# Patient Record
Sex: FEMALE | Race: WHITE | NOT HISPANIC OR LATINO | Employment: STUDENT | URBAN - NONMETROPOLITAN AREA
[De-identification: names, ages, dates, MRNs, and addresses within clinical notes are randomized per-mention and may not be internally consistent; named-entity substitution may affect disease eponyms.]

---

## 2017-03-15 ENCOUNTER — OFFICE VISIT (OUTPATIENT)
Dept: RETAIL CLINIC | Facility: CLINIC | Age: 22
End: 2017-03-15

## 2017-03-15 DIAGNOSIS — Z02.1 DRUG SCREENING, PRE-EMPLOYMENT: Primary | ICD-10-CM

## 2022-03-24 ENCOUNTER — OFFICE VISIT (OUTPATIENT)
Dept: OBSTETRICS AND GYNECOLOGY | Facility: CLINIC | Age: 27
End: 2022-03-24

## 2022-03-24 VITALS
DIASTOLIC BLOOD PRESSURE: 82 MMHG | WEIGHT: 279 LBS | HEIGHT: 64 IN | SYSTOLIC BLOOD PRESSURE: 146 MMHG | BODY MASS INDEX: 47.63 KG/M2

## 2022-03-24 DIAGNOSIS — Z83.49 FAMILY HISTORY OF MTHFR DEFICIENCY: ICD-10-CM

## 2022-03-24 DIAGNOSIS — Z11.3 ROUTINE SCREENING FOR STI (SEXUALLY TRANSMITTED INFECTION): ICD-10-CM

## 2022-03-24 DIAGNOSIS — Z12.4 SCREENING FOR CERVICAL CANCER: ICD-10-CM

## 2022-03-24 DIAGNOSIS — N91.2 AMENORRHEA: ICD-10-CM

## 2022-03-24 DIAGNOSIS — R61 NIGHT SWEATS: ICD-10-CM

## 2022-03-24 DIAGNOSIS — Z01.419 ENCOUNTER FOR GYNECOLOGICAL EXAMINATION WITHOUT ABNORMAL FINDING: Primary | ICD-10-CM

## 2022-03-24 PROCEDURE — 99213 OFFICE O/P EST LOW 20 MIN: CPT | Performed by: PHYSICIAN ASSISTANT

## 2022-03-24 PROCEDURE — 99385 PREV VISIT NEW AGE 18-39: CPT | Performed by: PHYSICIAN ASSISTANT

## 2022-03-24 NOTE — PATIENT INSTRUCTIONS
Encourage self breast exam monthly  Regular exercise  Condoms always  We will check labs today and follow-up in 3 weeks to review

## 2022-03-24 NOTE — PROGRESS NOTES
Subjective   Chief Complaint   Patient presents with   • Gynecologic Exam     Patient complains of night sweats.        Ada Perry is a 26 y.o. year old  presenting to be seen for her annual gynecological exam.   She has not previously had a GYN exam or Pap smear.  She has a history of secondary amenorrhea and has not had a menstrual cycle since 2020.  She would frequently go 5 to 6 months without menses prior to her LMP.  She reports night sweats have been going on for several months  She has been sexually active in the past, but has not been sexually active for a few months.  She used condoms sometimes but not consistently.  Has had multiple partners but one partner in the past year.   She has a sister who is positive for MTFHR  Would like STI screening with pap   Past Medical History:   Diagnosis Date   • Patient denies medical problems       No current outpatient medications on file.   No Known Allergies   Past Surgical History:   Procedure Laterality Date   • COLONOSCOPY     • TONSILLECTOMY     • WISDOM TOOTH EXTRACTION        Social History     Socioeconomic History   • Marital status: Single   Tobacco Use   • Smoking status: Never Smoker   • Smokeless tobacco: Never Used   Vaping Use   • Vaping Use: Never used   Substance and Sexual Activity   • Alcohol use: Yes   • Drug use: Never   • Sexual activity: Yes     Partners: Male     Birth control/protection: Abstinence, Condom      Family History   Problem Relation Age of Onset   • Pulmonary embolism Mother    • Lung cancer Mother    • Autoimmune disease Mother    • Diabetes Brother    • Other Sister         MTFHR   • Endometrial cancer Sister    • Polycystic ovary syndrome Sister    • Diabetes Paternal Grandfather    • Ovarian cancer Paternal Grandmother    • Breast cancer Paternal Grandmother    • Osteoporosis Maternal Grandmother    • Coronary artery disease Maternal Grandfather    • Breast cancer Paternal Aunt        Review of Systems  "  Constitutional: Negative for chills, diaphoresis and fever.        Weight gain   Gastrointestinal: Positive for diarrhea and nausea.   Endocrine:        Hot flashes   Genitourinary: Positive for menstrual problem and vaginal discharge.           Objective   /82   Ht 162.6 cm (64\")   Wt 127 kg (279 lb)   LMP 08/01/2020 (Approximate)   Breastfeeding No   BMI 47.89 kg/m²     Physical Exam  Constitutional:       Appearance: Normal appearance. She is well-developed and well-groomed. She is morbidly obese.   Eyes:      General: Lids are normal.      Extraocular Movements: Extraocular movements intact.      Conjunctiva/sclera: Conjunctivae normal.   Chest:   Breasts: Breasts are symmetrical.      Right: No inverted nipple, mass, nipple discharge, skin change, tenderness or axillary adenopathy.      Left: No inverted nipple, mass, nipple discharge, skin change, tenderness or axillary adenopathy.       Abdominal:      Palpations: Abdomen is soft.      Tenderness: There is no abdominal tenderness.   Genitourinary:     Labia:         Right: No rash, tenderness or lesion.         Left: No rash, tenderness or lesion.       Urethra: No prolapse, urethral pain, urethral swelling or urethral lesion.      Vagina: No vaginal discharge, tenderness or lesions.      Cervix: No cervical motion tenderness, discharge, friability or lesion.      Uterus: Not enlarged and not tender.       Adnexa:         Right: No mass or tenderness.          Left: No mass or tenderness.     Lymphadenopathy:      Upper Body:      Right upper body: No axillary adenopathy.      Left upper body: No axillary adenopathy.   Skin:     General: Skin is warm and dry.      Findings: No bruising or lesion.   Neurological:      General: No focal deficit present.      Mental Status: She is alert and oriented to person, place, and time.   Psychiatric:         Attention and Perception: Attention normal.         Mood and Affect: Mood normal.         Speech: " Speech normal.         Behavior: Behavior is cooperative.            Result Review :                   Assessment and Plan  Diagnoses and all orders for this visit:    1. Encounter for gynecological examination without abnormal finding (Primary)    2. Screening for cervical cancer  -     Pap IG, Ct-Ng, Rfx HPV ASCU; Future    3. Amenorrhea  -     Testosterone (Free & Total), LC / MS  -     Insulin, Total  -     Comprehensive Metabolic Panel  -     TSH  -     Prolactin  -     Follicle Stimulating Hormone  -     Estradiol  -     Luteinizing Hormone  -     HCG, B-subunit, Quantitative ([facility] Only)    4. Night sweats    5. Family history of MTHFR deficiency  -     MTHFR Mutation    6. Routine screening for STI (sexually transmitted infection)  -     Pap IG, Ct-Ng, Rfx HPV ASCU; Future      Patient Instructions   Encourage self breast exam monthly  Regular exercise  Condoms always  We will check labs today and follow-up in 3 weeks to review             This note was electronically signed.    Cherri Brian PA-C   March 24, 2022

## 2022-04-06 LAB
ALBUMIN SERPL-MCNC: 4.5 G/DL (ref 3.9–5)
ALBUMIN/GLOB SERPL: 1.9 {RATIO} (ref 1.2–2.2)
ALP SERPL-CCNC: 76 IU/L (ref 44–121)
ALT SERPL-CCNC: 60 IU/L (ref 0–32)
AST SERPL-CCNC: 41 IU/L (ref 0–40)
BILIRUB SERPL-MCNC: 0.3 MG/DL (ref 0–1.2)
BUN SERPL-MCNC: 13 MG/DL (ref 6–20)
BUN/CREAT SERPL: 19 (ref 9–23)
CALCIUM SERPL-MCNC: 9.5 MG/DL (ref 8.7–10.2)
CHLORIDE SERPL-SCNC: 106 MMOL/L (ref 96–106)
CO2 SERPL-SCNC: 19 MMOL/L (ref 20–29)
CREAT SERPL-MCNC: 0.7 MG/DL (ref 0.57–1)
EGFRCR SERPLBLD CKD-EPI 2021: 124 ML/MIN/1.73
ESTRADIOL SERPL-MCNC: 30.1 PG/ML
FSH SERPL-ACNC: 3.6 MIU/ML
GLOBULIN SER CALC-MCNC: 2.4 G/DL (ref 1.5–4.5)
GLUCOSE SERPL-MCNC: 103 MG/DL (ref 65–99)
HCG INTACT+B SERPL-ACNC: <1 MIU/ML
INSULIN SERPL-ACNC: 74.1 UIU/ML (ref 2.6–24.9)
LH SERPL-ACNC: 6.5 MIU/ML
MTHFR GENE MUT ANL BLD/T: NORMAL
POTASSIUM SERPL-SCNC: 3.6 MMOL/L (ref 3.5–5.2)
PROLACTIN SERPL-MCNC: 3.4 NG/ML (ref 4.8–23.3)
PROT SERPL-MCNC: 6.9 G/DL (ref 6–8.5)
SODIUM SERPL-SCNC: 143 MMOL/L (ref 134–144)
TESTOST FREE SERPL-MCNC: 2.4 PG/ML (ref 0–4.2)
TESTOST SERPL-MCNC: 41.8 NG/DL (ref 10–55)
TSH SERPL DL<=0.005 MIU/L-ACNC: 1.15 UIU/ML (ref 0.45–4.5)

## 2022-04-11 DIAGNOSIS — Z12.4 SCREENING FOR CERVICAL CANCER: ICD-10-CM

## 2022-04-11 DIAGNOSIS — Z11.3 ROUTINE SCREENING FOR STI (SEXUALLY TRANSMITTED INFECTION): ICD-10-CM

## 2022-04-12 RX ORDER — METRONIDAZOLE 500 MG/1
500 TABLET ORAL 2 TIMES DAILY
Qty: 14 TABLET | Refills: 0 | Status: SHIPPED | OUTPATIENT
Start: 2022-04-12 | End: 2022-04-19

## 2022-04-12 NOTE — PROGRESS NOTES
Please inform patient her Pap smear has returned and is nondiagnostic due to inflammation.  It shows no trichomonas infection.  I have sent an antibiotic to her pharmacy for treatment.  Her partner will need to seek treatment for trichomonas.  Abstain for 7 days once both have completed treatment.  Will need to repeat her Pap smear after treatment is completed.

## 2022-07-15 DIAGNOSIS — Z15.89 HOMOZYGOUS FOR MTHFR GENE MUTATION: Primary | ICD-10-CM

## 2022-08-24 NOTE — PROGRESS NOTES
"  Subjective     PROBLEM LIST:  1. MTHFR mutation    CHIEF COMPLAINT: MTHFR mutation      HISTORY OF PRESENT ILLNESS:  The patient is a 27 y.o. female, referred for evaluation of an MTHFR mutation.    She says that her sister was found to have a heterozygous MTHFR mutation during evaluation for multiple miscarriages and infertility.  Her sister also has PCOS and endometriosis.  Her sister after losing weight is currently pregnant.    The patient had MTHFR mutation testing and was found to be homozygous.  She has no personal or family history of VTE.  She has never attempted pregnancy.  She says that her blood pressure is high and she is trying to exercise more.    REVIEW OF SYSTEMS:  A 14 point review of systems was performed and is negative except as noted above.    Past Medical History:   Diagnosis Date   • Patient denies medical problems                Objective     /94   Pulse 98   Temp 96.8 °F (36 °C) (Temporal)   Resp 16   Ht 162.6 cm (64\")   Wt 128 kg (283 lb)   SpO2 99%   BMI 48.58 kg/m²   Performance Status:0              General: well appearing female in no acute distress  Neuro: alert and oriented  Extremities: no lower extremity edema  Skin: no rashes, lesions, bruising, or petechiae  Psych: mood and affect appropriate    No results found for: WBC, HGB, HCT, MCV, PLT  Lab Results   Component Value Date    GLUCOSE 103 (H) 03/24/2022    BUN 13 03/24/2022    CREATININE 0.70 03/24/2022    BCR 19 03/24/2022    K 3.6 03/24/2022    CO2 19 (L) 03/24/2022    CALCIUM 9.5 03/24/2022    PROTENTOTREF 6.9 03/24/2022    ALBUMIN 4.5 03/24/2022    LABIL2 1.9 03/24/2022    AST 41 (H) 03/24/2022    ALT 60 (H) 03/24/2022       No image results found.            ASSESSMENT AND PLAN:     Ada Perry is a 27 y.o. female referred after a lab test showed that she was homozygous for an MTHFR mutation.    We discussed that MTHFR homozygous mutations are fairly common and present in about 10% of the general " population, while about 40% are heterozygous.  The most recent data indicates that these variants are not significantly associated with disease or cardiovascular outcomes.  I do not have any specific recommendations for her other than to encourage her in her efforts for ongoing weight loss and increasing activity.  I do not think she needs any specific hematology follow-up.  Her other family members do not need to be screened for this mutation.    F/u PRN         A total greater than 35 mins minutes was spent in face to face patient time, examination, counseling, charting, reviewing test results, and reviewing outside records.    Sabrina Rey MD    8/30/2022

## 2022-08-27 ENCOUNTER — TELEMEDICINE (OUTPATIENT)
Dept: FAMILY MEDICINE CLINIC | Facility: TELEHEALTH | Age: 27
End: 2022-08-27

## 2022-08-27 DIAGNOSIS — R39.89 SUSPECTED UTI: Primary | ICD-10-CM

## 2022-08-27 PROCEDURE — 99213 OFFICE O/P EST LOW 20 MIN: CPT | Performed by: NURSE PRACTITIONER

## 2022-08-27 RX ORDER — PHENAZOPYRIDINE HYDROCHLORIDE 100 MG/1
100 TABLET, FILM COATED ORAL 3 TIMES DAILY PRN
Qty: 6 TABLET | Refills: 0 | Status: SHIPPED | OUTPATIENT
Start: 2022-08-27 | End: 2022-08-29

## 2022-08-27 RX ORDER — NITROFURANTOIN 25; 75 MG/1; MG/1
100 CAPSULE ORAL 2 TIMES DAILY
Qty: 14 CAPSULE | Refills: 0 | Status: SHIPPED | OUTPATIENT
Start: 2022-08-27 | End: 2022-09-03

## 2022-08-27 NOTE — PROGRESS NOTES
You have chosen to receive care through a telehealth visit.  Do you consent to use a video/audio connection for your medical care today? Yes     CHIEF COMPLAINT  Chief Complaint   Patient presents with   • Urinary Tract Infection         HPI  Ada Perry is a 26 y.o. female  presents with complaint of UTI. Reports dysuria, small amt of pink when she wipes. No fever or chills. No nausea or vomiting. No back pain. Denies pyelonephritis. Reports she has not taken any medications for her symptoms.    Review of Systems   Constitutional: Negative for chills, fatigue and fever.   HENT: Negative.    Respiratory: Negative for chest tightness and shortness of breath.    Gastrointestinal: Negative for abdominal pain, nausea and vomiting.   Genitourinary: Positive for dysuria, frequency and hematuria. Negative for urgency.   Musculoskeletal: Negative for back pain.   Neurological: Negative for headaches.   Psychiatric/Behavioral: Negative.        Past Medical History:   Diagnosis Date   • Patient denies medical problems        Family History   Problem Relation Age of Onset   • Pulmonary embolism Mother    • Lung cancer Mother    • Autoimmune disease Mother    • Diabetes Brother    • Other Sister         MTFHR   • Endometrial cancer Sister    • Polycystic ovary syndrome Sister    • Diabetes Paternal Grandfather    • Ovarian cancer Paternal Grandmother    • Breast cancer Paternal Grandmother    • Osteoporosis Maternal Grandmother    • Coronary artery disease Maternal Grandfather    • Breast cancer Paternal Aunt        Social History     Socioeconomic History   • Marital status: Single   Tobacco Use   • Smoking status: Never Smoker   • Smokeless tobacco: Never Used   Vaping Use   • Vaping Use: Never used   Substance and Sexual Activity   • Alcohol use: Yes   • Drug use: Never   • Sexual activity: Yes     Partners: Male     Birth control/protection: Abstinence, Condom       Ada Perry  reports that she has never smoked. She  has never used smokeless tobacco.. I have educated her on the risk of diseases from using tobacco products such as cancer, COPD and heart disease.       I spent 1 minutes counseling the patient.              Breastfeeding No     PHYSICAL EXAM  Physical Exam   Constitutional: She is oriented to person, place, and time. She appears well-developed and well-nourished. No distress.   HENT:   Head: Normocephalic and atraumatic.   Right Ear: Hearing normal.   Left Ear: Hearing normal.   Nose: No congestion.   Mouth/Throat: Mouth/Lips are normal.  Eyes: Conjunctivae and lids are normal.   Pulmonary/Chest: Effort normal.  No respiratory distress.  Abdominal: There is abdominal tenderness in the suprapubic area. There is no CVA tenderness.   Patient directed exam   Lymphadenopathy:        Right cervical: No anterior cervical adenopathy present.       Left cervical: No anterior cervical adenopathy present.   Neurological: She is alert and oriented to person, place, and time.   Psychiatric: She has a normal mood and affect. Her speech is normal and behavior is normal.       Results for orders placed or performed in visit on 03/24/22   Testosterone (Free & Total), LC / MS    Specimen: Blood   Result Value Ref Range    Testosterone, Total 41.8 10.0 - 55.0 ng/dL    Testosterone, Free 2.4 0.0 - 4.2 pg/mL   Insulin, Total    Specimen: Blood   Result Value Ref Range    Insulin 74.1 (H) 2.6 - 24.9 uIU/mL   Comprehensive Metabolic Panel    Specimen: Blood   Result Value Ref Range    Glucose 103 (H) 65 - 99 mg/dL    BUN 13 6 - 20 mg/dL    Creatinine 0.70 0.57 - 1.00 mg/dL    EGFR Result 124 >59 mL/min/1.73    BUN/Creatinine Ratio 19 9 - 23    Sodium 143 134 - 144 mmol/L    Potassium 3.6 3.5 - 5.2 mmol/L    Chloride 106 96 - 106 mmol/L    Total CO2 19 (L) 20 - 29 mmol/L    Calcium 9.5 8.7 - 10.2 mg/dL    Total Protein 6.9 6.0 - 8.5 g/dL    Albumin 4.5 3.9 - 5.0 g/dL    Globulin 2.4 1.5 - 4.5 g/dL    A/G Ratio 1.9 1.2 - 2.2    Total  Bilirubin 0.3 0.0 - 1.2 mg/dL    Alkaline Phosphatase 76 44 - 121 IU/L    AST (SGOT) 41 (H) 0 - 40 IU/L    ALT (SGPT) 60 (H) 0 - 32 IU/L   TSH    Specimen: Blood   Result Value Ref Range    TSH 1.150 0.450 - 4.500 uIU/mL   Prolactin    Specimen: Blood   Result Value Ref Range    Prolactin 3.4 (L) 4.8 - 23.3 ng/mL   Follicle Stimulating Hormone    Specimen: Blood   Result Value Ref Range    FSH 3.6 mIU/mL   Estradiol    Specimen: Blood   Result Value Ref Range    Estradiol 30.1 pg/mL   Luteinizing Hormone    Specimen: Blood   Result Value Ref Range    LH 6.5 mIU/mL   MTHFR Mutation    Specimen: Blood   Result Value Ref Range    MTHFR Comment    HCG, B-subunit, Quantitative ([facility] Only)    Specimen: Blood   Result Value Ref Range    HCG Quantitative <1 mIU/mL       Diagnoses and all orders for this visit:    1. Suspected UTI (Primary)    Other orders  -     nitrofurantoin, macrocrystal-monohydrate, (Macrobid) 100 MG capsule; Take 1 capsule by mouth 2 (Two) Times a Day for 7 days.  Dispense: 14 capsule; Refill: 0  -     phenazopyridine (Pyridium) 100 MG tablet; Take 1 tablet by mouth 3 (Three) Times a Day As Needed for Bladder Spasms for up to 2 days.  Dispense: 6 tablet; Refill: 0    rest  Increase water  PCP if symptoms persist  ER for worsening symptoms such as high fever, nausea vomiting, or abdominal or back pain      FOLLOW-UP  As discussed during visit with PCP/[facility] if no improvement or Urgent Care/Emergency Department if worsening of symptoms    Patient verbalizes understanding of medication dosage, comfort measures, instructions for treatment and follow-up.    Shirley Cortés, APRN  08/27/2022  19:41 EDT    The use of a video visit has been reviewed with the patient and verbal informed consent has been obtained. Myself and Ada Perry participated in this visit. The patient is located in [address] [place] [address].    I am located in [place], [place]. Mychart and Zoom were  utilized. I spent 5 minutes in the patient's chart for this visit.

## 2022-08-30 ENCOUNTER — CONSULT (OUTPATIENT)
Dept: ONCOLOGY | Facility: CLINIC | Age: 27
End: 2022-08-30

## 2022-08-30 VITALS
TEMPERATURE: 96.8 F | OXYGEN SATURATION: 99 % | BODY MASS INDEX: 48.32 KG/M2 | HEART RATE: 98 BPM | SYSTOLIC BLOOD PRESSURE: 170 MMHG | WEIGHT: 283 LBS | DIASTOLIC BLOOD PRESSURE: 94 MMHG | HEIGHT: 64 IN | RESPIRATION RATE: 16 BRPM

## 2022-08-30 DIAGNOSIS — Z15.89 MTHFR MUTATION: Primary | ICD-10-CM

## 2022-08-30 PROCEDURE — 99203 OFFICE O/P NEW LOW 30 MIN: CPT | Performed by: INTERNAL MEDICINE

## 2022-09-28 ENCOUNTER — OFFICE VISIT (OUTPATIENT)
Dept: INTERNAL MEDICINE | Facility: CLINIC | Age: 27
End: 2022-09-28

## 2022-09-28 VITALS
HEART RATE: 100 BPM | WEIGHT: 280 LBS | DIASTOLIC BLOOD PRESSURE: 86 MMHG | TEMPERATURE: 97.7 F | HEIGHT: 63 IN | OXYGEN SATURATION: 99 % | SYSTOLIC BLOOD PRESSURE: 113 MMHG | RESPIRATION RATE: 16 BRPM | BODY MASS INDEX: 49.61 KG/M2

## 2022-09-28 DIAGNOSIS — Z13.0 SCREENING FOR ENDOCRINE, NUTRITIONAL, METABOLIC AND IMMUNITY DISORDER: ICD-10-CM

## 2022-09-28 DIAGNOSIS — Z13.21 SCREENING FOR ENDOCRINE, NUTRITIONAL, METABOLIC AND IMMUNITY DISORDER: ICD-10-CM

## 2022-09-28 DIAGNOSIS — Z13.29 SCREENING FOR ENDOCRINE, NUTRITIONAL, METABOLIC AND IMMUNITY DISORDER: ICD-10-CM

## 2022-09-28 DIAGNOSIS — E88.81 INSULIN RESISTANCE: ICD-10-CM

## 2022-09-28 DIAGNOSIS — Z13.228 SCREENING FOR ENDOCRINE, NUTRITIONAL, METABOLIC AND IMMUNITY DISORDER: ICD-10-CM

## 2022-09-28 DIAGNOSIS — N92.6 IRREGULAR PERIODS/MENSTRUAL CYCLES: ICD-10-CM

## 2022-09-28 DIAGNOSIS — E55.9 VITAMIN D INSUFFICIENCY: ICD-10-CM

## 2022-09-28 DIAGNOSIS — Z00.00 PHYSICAL EXAM, ANNUAL: Primary | ICD-10-CM

## 2022-09-28 DIAGNOSIS — E66.01 MORBID OBESITY WITH BODY MASS INDEX (BMI) OF 40.0 TO 49.9: ICD-10-CM

## 2022-09-28 PROCEDURE — 99395 PREV VISIT EST AGE 18-39: CPT | Performed by: NURSE PRACTITIONER

## 2022-09-28 PROCEDURE — 3008F BODY MASS INDEX DOCD: CPT | Performed by: NURSE PRACTITIONER

## 2022-09-28 PROCEDURE — 2014F MENTAL STATUS ASSESS: CPT | Performed by: NURSE PRACTITIONER

## 2022-09-29 LAB
25(OH)D3+25(OH)D2 SERPL-MCNC: 19.4 NG/ML (ref 30–100)
ALBUMIN SERPL-MCNC: 4.6 G/DL (ref 3.5–5.2)
ALBUMIN/GLOB SERPL: 2.7 G/DL
ALP SERPL-CCNC: 62 U/L (ref 39–117)
ALT SERPL-CCNC: 42 U/L (ref 1–33)
AST SERPL-CCNC: 27 U/L (ref 1–32)
BASOPHILS # BLD AUTO: 0.05 10*3/MM3 (ref 0–0.2)
BASOPHILS NFR BLD AUTO: 0.7 % (ref 0–1.5)
BILIRUB SERPL-MCNC: 0.4 MG/DL (ref 0–1.2)
BUN SERPL-MCNC: 14 MG/DL (ref 6–20)
BUN/CREAT SERPL: 18.7 (ref 7–25)
CALCIUM SERPL-MCNC: 9.4 MG/DL (ref 8.6–10.5)
CHLORIDE SERPL-SCNC: 107 MMOL/L (ref 98–107)
CHOLEST SERPL-MCNC: 189 MG/DL (ref 0–200)
CO2 SERPL-SCNC: 25.9 MMOL/L (ref 22–29)
CREAT SERPL-MCNC: 0.75 MG/DL (ref 0.57–1)
EGFRCR SERPLBLD CKD-EPI 2021: 113.5 ML/MIN/1.73
EOSINOPHIL # BLD AUTO: 0.17 10*3/MM3 (ref 0–0.4)
EOSINOPHIL NFR BLD AUTO: 2.4 % (ref 0.3–6.2)
ERYTHROCYTE [DISTWIDTH] IN BLOOD BY AUTOMATED COUNT: 12.4 % (ref 12.3–15.4)
FERRITIN SERPL-MCNC: 68 NG/ML (ref 13–150)
FT4I SERPL CALC-MCNC: 2.1 (ref 1.2–4.9)
GLOBULIN SER CALC-MCNC: 1.7 GM/DL
GLUCOSE SERPL-MCNC: 98 MG/DL (ref 65–99)
HBA1C MFR BLD: 5.3 % (ref 4.8–5.6)
HCT VFR BLD AUTO: 42.2 % (ref 34–46.6)
HDLC SERPL-MCNC: 41 MG/DL (ref 40–60)
HGB BLD-MCNC: 14.8 G/DL (ref 12–15.9)
IMM GRANULOCYTES # BLD AUTO: 0.01 10*3/MM3 (ref 0–0.05)
IMM GRANULOCYTES NFR BLD AUTO: 0.1 % (ref 0–0.5)
IRON SATN MFR SERPL: 31 % (ref 20–50)
IRON SERPL-MCNC: 112 MCG/DL (ref 37–145)
LDLC SERPL CALC-MCNC: 114 MG/DL (ref 0–100)
LYMPHOCYTES # BLD AUTO: 2.6 10*3/MM3 (ref 0.7–3.1)
LYMPHOCYTES NFR BLD AUTO: 36.8 % (ref 19.6–45.3)
MCH RBC QN AUTO: 31 PG (ref 26.6–33)
MCHC RBC AUTO-ENTMCNC: 35.1 G/DL (ref 31.5–35.7)
MCV RBC AUTO: 88.5 FL (ref 79–97)
MONOCYTES # BLD AUTO: 0.53 10*3/MM3 (ref 0.1–0.9)
MONOCYTES NFR BLD AUTO: 7.5 % (ref 5–12)
NEUTROPHILS # BLD AUTO: 3.71 10*3/MM3 (ref 1.7–7)
NEUTROPHILS NFR BLD AUTO: 52.5 % (ref 42.7–76)
NRBC BLD AUTO-RTO: 0 /100 WBC (ref 0–0.2)
PLATELET # BLD AUTO: 276 10*3/MM3 (ref 140–450)
POTASSIUM SERPL-SCNC: 4.7 MMOL/L (ref 3.5–5.2)
PROT SERPL-MCNC: 6.3 G/DL (ref 6–8.5)
RBC # BLD AUTO: 4.77 10*6/MM3 (ref 3.77–5.28)
SODIUM SERPL-SCNC: 144 MMOL/L (ref 136–145)
T3RU NFR SERPL: 24 % (ref 24–39)
T4 SERPL-MCNC: 8.6 UG/DL (ref 4.5–12)
THYROPEROXIDASE AB SERPL-ACNC: <8 IU/ML (ref 0–34)
TIBC SERPL-MCNC: 367 MCG/DL
TRIGL SERPL-MCNC: 191 MG/DL (ref 0–150)
TSH SERPL DL<=0.005 MIU/L-ACNC: 2.23 UIU/ML (ref 0.45–4.5)
UIBC SERPL-MCNC: 255 MCG/DL (ref 112–346)
VIT B12 SERPL-MCNC: 343 PG/ML (ref 211–946)
VLDLC SERPL CALC-MCNC: 34 MG/DL (ref 5–40)
WBC # BLD AUTO: 7.07 10*3/MM3 (ref 3.4–10.8)

## 2022-09-29 RX ORDER — ERGOCALCIFEROL 1.25 MG/1
50000 CAPSULE ORAL WEEKLY
Qty: 12 CAPSULE | Refills: 0 | Status: SHIPPED | OUTPATIENT
Start: 2022-09-29

## 2022-10-08 ENCOUNTER — HOSPITAL ENCOUNTER (OUTPATIENT)
Dept: ULTRASOUND IMAGING | Facility: HOSPITAL | Age: 27
Discharge: HOME | End: 2022-10-08
Admitting: NURSE PRACTITIONER

## 2022-10-08 PROCEDURE — 76830 TRANSVAGINAL US NON-OB: CPT

## 2022-10-12 NOTE — PROGRESS NOTES
Please contact patient and let her know that transvaginal ultrasound does not visualize right ovary and there is limited evaluation recommend seeing GYN for further evaluation.  She should be able to call and make an appointment but if she needs referral please let me know.

## 2022-12-29 ENCOUNTER — E-VISIT (OUTPATIENT)
Dept: FAMILY MEDICINE CLINIC | Facility: TELEHEALTH | Age: 27
End: 2022-12-29

## 2022-12-29 NOTE — E-VISIT ESCALATED
Patient escalated   Provider Shala Duff chose to escalate patient to another level of care because: Desired treatments not available   Patient was sent the following message:   Based on the information you've provided us, you need to take another step to get care. [facility] is unable to diagnose ear infections as the eardrum need to be visualized to do this. Please go to your nearest Urgent Care facility for an evaluation.   What to do now:   Urgent Care   You should be seen within the next 24 hours.   Please go to a walk-in clinic or urgent care, or make an appointment to be seen within the next 24 hours.   You won't be charged for your eVisit. If you paid with a credit card, the charge will be reversed.   Chief Complaint: Ear pain   Patient introduction   Patient is 27-year-old female who has a blocked sensation, a sensation of fullness, and pressure in their left ear.   Patient-submitted comments   Patient writes: I have an ear infection, and I was needing drops and an antibiotic. .   Patient did not request an excuse note.   General presentation   Patient first noticed symptoms 2 to 3 days ago. They came on gradually. Symptoms come and go.   Patient does not have a fever.   Patient has symptoms of tinnitus. No hearing loss. New or increased drainage from the left ear. Pain and discomfort didn't improve after drainage started. Drainage smells foul. Drainage is white.   No history of PE tubes.   Patient's outer ear is swollen.   Patient swam or got water in their ears in the last 7 days. Recent exposure to tobacco smoke. Patient regularly uses earphones.   No recent airplane travel, scuba diving, hiking or driving in the mountains, or exposure to a loud blast or explosion.   Patient has used the following OTC medication(s) for their ear symptoms: acetaminophen and cetirizine.   Last antibiotic treatment for an ear infection was more than 1 year ago.   Review of alarm symptoms/red flags:    No  current treatment by ENT    No current PE tubes in affected ear(s)    No mastoid or ear surgery in the last 5 years    No sharp or pointed object in ear canal    No foreign body in ear    No recent head trauma    No vision changes    No symptoms suggesting mastoiditis    No hearing loss in both ears    No red/bloody ear drainage   Pregnancy/menstrual status/breastfeeding:   Not pregnant. Not breastfeeding. Regarding last menstrual period, patient writes: August 2020 I do not have a regular period .   Self-exam:    No difficulty moving their chin toward their chest    Symptoms including mastoid pain or tenderness    Pain is unchanged by chewing or moving the jaw    Pain is exacerbated by manipulation of the pinna and/or pressure on the tragus    Pain is worse when lying down   Current medications   Currently taking vitamin D 1.25 MG (82732 UT) capsule capsule.   Medication allergies   None.   Medication contraindication review   No history of arrhythmia, congestive heart failure, recent myocardial infarction, heart block, heart disease, hypertension, hyperthyroidism, mononucleosis, or myasthenia gravis.   No known history of amoxicillin-clavulanate-associated jaundice or hepatic impairment.   No known history of azithromycin-associated cholestatic jaundice or hepatic impairment.   No history of aspirin triad; NSAID-induced asthma/urticaria; coronary artery disease; coagulation disorder; urinary retention; electrolyte abnormalities; G6PD deficiency; GI bleeding; hypertension; influenza, varicella, or febrile viral infection; or CABG surgery.   Past medical history   Immune conditions: No immunocompromising conditions. No history of cancer.   Assessment:   Patient determined to need a level of care not appropriate to be delivered through eVisit.   Plan:   Patient informed of need to seek in-person care   ----------   Electronically signed by JESSICA Mcclelland on 2022-12-29 at 07:19AM   ----------   Patient Interview  Transcript:   How would you describe your ear pain or discomfort? Select all that apply.    Blocked or plugged    Full    Pressure   Not selected:    Achy    Itchy    Crackling or popping    Throbbing    Shooting/stabbing/sharp   Which ear is affected? Select one.    My left ear   Not selected:    My right ear    Both of my ears   When did you first notice this ear pain or discomfort? Select one.    2 to 3 days ago   Not selected:    Today    Yesterday    4 to 5 days ago    More than 5 days ago   Did your ear pain or discomfort come on suddenly or over time? Select one.    Over time   Not selected:    Suddenly    I'm not sure   Is the outside of the affected ear red, swollen, warm to the touch, or painful to the touch? Select all that apply.    Swollen   Not selected:    Red    Warm to the touch    Painful to the touch    None of these   Is your ear pain or discomfort always there, or does it come and go? Select one.    Comes and goes   Not selected:    Always there    I'm not sure   Does the pain or discomfort get worse when you bite or chew? Select one.    No   Not selected:    Yes   Along with your ear symptoms, have you had a fever or felt hot? Select one.    No   Not selected:    Yes   Do you have any of these on the same side as your affected ear?    Pain or tenderness over the bone behind your ear   Not selected:    Redness over the bone behind your ear    Warmth over the bone behind your ear    None of the above   Do your symptoms include the sudden onset of ringing in one or both ears? Select one.    Yes   Not selected:    No   Do your symptoms include hearing loss? Select one.    No   Not selected:    Yes, I can't hear as well as I usually do    Yes, I can't hear at all in either ear   Has there been fluid draining out of either ear? Select one.    Yes, and this is new or more than the usual amount   Not selected:    Yes, but no more than usual    No   Which ear has fluid draining out of it (or more fluid  draining out of it than usual)? Select one.    Left   Not selected:    Right    Both   Once your ear started draining, did your ear pain or discomfort suddenly get better? Select one.    No   Not selected:    Yes   Does the drainage smell bad or foul? Select one.    Yes   Not selected:    No   What color is the drainage? Select one.    White or clear   Not selected:    Yellow    [place] brown, like earwax    Green    Red or bloody    I'm not sure   Along with your ear symptoms, have you had any of these cold symptoms? Select all that apply.    None of the above   Not selected:    Runny nose    Stuffy nose (nasal congestion)    Postnasal drip    Sinus pain or pressure    Cough    Scratchy or sore throat   Along with your ear symptoms, do you have any of these throat or digestion symptoms? Select all that apply.    None of these   Not selected:    Burning feeling in your chest (heartburn)    Swallowed food or liquids getting stuck and coming back up    Feeling like there's a lump in your throat    Hoarse voice    Difficulty swallowing   Along with your ear symptoms, have you had a headache? Select one.    No   Not selected:    Yes, and the pain is mild to moderate    Yes, and the pain is severe    Yes, and the pain is unbearable    Yes, and it's the worst headache of my life   Have you had any of these vision changes? Select all that apply.    None of these   Not selected:    Blurry vision    Double vision    I can't see at all from one or both eyes   Along with your ear symptoms, have you felt dizzy or had vertigo? This includes feeling like you're spinning, swaying, or tilting; feeling light-headed; or feeling like the room is moving around you. Select one.    No   Not selected:    Yes   Do your symptoms include vomiting? Select one.    No   Not selected:    Yes   Can you move your chin toward your chest? Select one.    Yes   Not selected:    No, my neck is too stiff   Does your ear pain or discomfort get worse if  you do either of these: 1. Pull the cartilage part of your ear up and back 2. Push on your tragus (the flesh in front of your ear opening)    Yes   Not selected:    No   Take a moment and lie down. Does your ear pain or discomfort feel worse when you lie down? Select one.    Yes   Not selected:    No   Right before your symptoms started, did you put anything sharp or pointed into your ear canal? Select one.    No   Not selected:    Yes   Is it possible that you have something stuck in your ear canal? Examples include a bead, button, rock, or part of an earbud. Select one.    No   Not selected:    Yes   Right before your ear pain began, did you have a severe head or ear injury? Examples include: - A blow to your head or ear - Hitting your head or ear on a hard surface - Falling on your ear while skateboarding or skiing - A motor vehicle accident - A sports injury, such as in wrestling - Penetrating trauma, such as a knife wound Select one.    No   Not selected:    Yes   In the week before your symptoms started, did any of these apply to you? Select all that apply.    None of the above   Not selected:    Air travel    Scuba diving    Hiking or driving in the mountains    Exposed to a loud blast or explosion   In the few weeks before your symptoms started, did you go swimming or get water in one or both ears? Select one.    Yes   Not selected:    No   Have you recently been exposed to more smoke or air pollution than usual? Select all that apply.    Yes, tobacco smoke   Not selected:    Yes, smoke from a fire or wood-burning stove    Yes, air pollution    None of the above   Do you regularly use any of these items? Select all that apply.    Earbuds or in-ear headphones   Not selected:    Hearing aids    Swim caps    Cotton swabs to clean your ears    Earwax removal devices, such as an irrigation kit    None of the above   Are you being treated by an Ear, Nose and Throat (ENT) doctor for an ear condition? Select one.     No   Not selected:    Yes   Do you have ear tubes? Some other names for ear tubes are tympanostomy tubes, ventilation tubes, or pressure equalization (PE) tubes. Select one.    No   Not selected:    Yes, in my left ear only    Yes, in my right ear only    Yes, in both ears    No, but I've had them in the past   In the past 5 years, have you had mastoid surgery or ear surgery (not including ear tube placement or removal)? Select one.    No   Not selected:    Yes   When was the last time you were treated with antibiotics for an ear infection? This includes both oral antibiotics and antibiotic ear drops. Select one.    More than a year ago   Not selected:    Never    Within the last month    1 to 3 months ago    4 months to a year ago    I'm not sure   Do you have any of these conditions that can affect the immune system? Scroll to see all options. Select all that apply.    None of these   Not selected:    History of bone marrow transplant    Chronic kidney disease    Chronic liver disease (including cirrhosis)    HIV/AIDS    Inflammatory bowel disease (Crohn's disease or ulcerative colitis)    Lupus    Moderate to severe plaque psoriasis    Multiple sclerosis    Rheumatoid arthritis    Sickle cell anemia    Alpha or beta thalassemia    History of solid organ transplant (kidney, liver, or heart)    History of spleen removal    An autoimmune disorder not listed here    A condition requiring treatment with long-term use of oral steroids (such as prednisone, prednisolone, or dexamethasone)   Have you ever been diagnosed with cancer? Select one.    No   Not selected:    Yes, I have cancer now    Yes, but I'm in remission   Are you pregnant? Select one.    No   Not selected:    Yes   When was your last menstrual period? If you don't currently have periods or no longer have periods, please briefly explain.    August 2020 I do not have a regular period   Are you breastfeeding? Select one.    No   Not selected:    Yes   The  next few questions help us recommend the best treatment for you. Have you used any of these non-prescription medications for your ear symptoms? Select all that apply.    Acetaminophen (Tylenol)    Cetirizine (Zyrtec)   Not selected:    Carbamide peroxide otic (Auro, Debrox)    Diphenhydramine (Benadryl)    Fexofenadine (Allegra)    Fluticasone (Flonase)    Ibuprofen (Advil, Motrin, Midol)    Naproxen (Aleve)    Isopropyl alcohol in glycerin ear drops (Swim Ear drops)    Loratadine (Alavert, Claritin)    Oxymetazoline (Afrin)    Phenylephrine (Sudafed)    Triamcinolone (Nasacort)    None of these   Are you still taking these medications listed in your medical record? If you're not taking any of these, click Next. Select all that apply.    vitamin D 1.25 MG (92382 UT) capsule capsule   Are you taking any other medications, vitamins, or supplements? Select one.    No   Not selected:    Yes   Have you ever had an allergic or bad reaction to any medication? Select one.    No   Not selected:    Yes   Are you currently being treated for any of these conditions? Select all that apply.    None of the above   Not selected:    Arrhythmia    Congestive heart failure    Recent heart attack    Heart block    Blockage or narrowing of the blood vessels of the heart    Hypertension    Hyperthyroidism    Mononucleosis    Myasthenia gravis   Amoxicillin-clavulanate (Augmentin)    No   Not selected:    Jaundice    Liver problems   Azithromycin (Zithromax, Zmax)    No   Not selected:    Jaundice    Liver problems   Have you had any of these conditions? Select all that apply.    None of the above   Not selected:    Aspirin triad (also known as Samter's triad or ASA triad)    Asthma or hives from taking aspirin or other NSAIDs, such as ibuprofen or naproxen    Coagulation disorder    Difficulty urinating or completely emptying your bladder    Electrolyte abnormalities    G6PD deficiency    Gastrointestinal (GI) bleeding    Influenza,  chickenpox, or a viral infection with fever    Recent coronary artery bypass graft (CABG) surgery   Have you taken any monoamine oxidase inhibitor (MAOI) medications within the last 14 days? Examples include rasagiline (Azilect), selegiline (Eldepryl, Zelapar), isocarboxazid (Marplan), phenelzine (Nardil), and tranylcypromine (Parnate). Select one.    No   Not selected:    Yes   Do you need a doctor's note? A doctor's note confirms that you received care today and states when you can return to school or work. It does not contain information about your diagnosis or treatment plan. Your provider will make the final decision on whether to give you a doctor's note. Doctor's notes CANNOT be backdated. Select one.    No   Not selected:    Today only (1 day)    Today and tomorrow (2 days)    3 days   Is there anything else you'd like to tell us about your symptoms?    I have an ear infection, and I was needing drops and an antibiotic.   ----------   Medical history   Medical history data does not currently exist for this patient.

## 2023-02-03 ENCOUNTER — OFFICE VISIT (OUTPATIENT)
Dept: INTERNAL MEDICINE | Facility: CLINIC | Age: 28
End: 2023-02-03
Payer: COMMERCIAL

## 2023-02-03 VITALS
OXYGEN SATURATION: 96 % | DIASTOLIC BLOOD PRESSURE: 80 MMHG | HEART RATE: 107 BPM | WEIGHT: 293 LBS | BODY MASS INDEX: 51.91 KG/M2 | SYSTOLIC BLOOD PRESSURE: 130 MMHG | HEIGHT: 63 IN | TEMPERATURE: 98.4 F

## 2023-02-03 DIAGNOSIS — K62.89 RECTAL PAIN: ICD-10-CM

## 2023-02-03 DIAGNOSIS — Z28.311 NEED FOR SECOND DOSE OF COVID-19 VACCINE: ICD-10-CM

## 2023-02-03 DIAGNOSIS — K92.1 MELENA: ICD-10-CM

## 2023-02-03 DIAGNOSIS — R10.11 RUQ PAIN: ICD-10-CM

## 2023-02-03 DIAGNOSIS — R19.8 ALTERNATING CONSTIPATION AND DIARRHEA: ICD-10-CM

## 2023-02-03 DIAGNOSIS — Z23 NEED FOR SECOND DOSE OF COVID-19 VACCINE: ICD-10-CM

## 2023-02-03 DIAGNOSIS — F41.9 ANXIETY: ICD-10-CM

## 2023-02-03 DIAGNOSIS — K62.5 RECTAL BLEEDING: Primary | ICD-10-CM

## 2023-02-03 PROCEDURE — 99214 OFFICE O/P EST MOD 30 MIN: CPT | Performed by: NURSE PRACTITIONER

## 2023-02-03 PROCEDURE — 91305 COVID-19 (PFIZER) 12+ YRS: CPT | Performed by: NURSE PRACTITIONER

## 2023-02-03 PROCEDURE — 0051A COVID-19 (PFIZER) 12+ YRS: CPT | Performed by: NURSE PRACTITIONER

## 2023-02-03 RX ORDER — SERTRALINE HYDROCHLORIDE 25 MG/1
25 TABLET, FILM COATED ORAL DAILY
Qty: 90 TABLET | Refills: 0 | Status: SHIPPED | OUTPATIENT
Start: 2023-02-03

## 2023-02-03 NOTE — PROGRESS NOTES
"Answers for HPI/ROS submitted by the patient on 2/3/2023  Please describe your symptoms.: I'm currently experiencing more symptoms once again rectal bleeding, on top of that now I have new symptoms present. I feel an intense stabbing pain my in anus at random times. When I have the sensation to use the bathroom I get immense pressure it feels like to all of sudden and then not a lot of stool comes out. I've been having to strain now and try to use the bathroom for 5-10 minutes plus to be able to pass stool. Normally my bowel movements even with the previous rectal bleeding issues, have always been perfectly fine. I used to be able to pass stool immediately without any issue. As well it feels like I'm having spasm in my abdomen whenever I try to use the bathroom or after and it's like a steady pain., , I was wanting to see if I could get a referral to a gastro. , , As well I have been having overwhelming anxiety where it feels like a ball in my throat, and its hard to get through panic attacks during class, I was wanting to also see who I can go to for that as well.  Have you had these symptoms before?: Yes  How long have you been having these symptoms?: Greater than 2 weeks  Please describe any probable cause for these symptoms. : Fidelia had rectal bleeding issues in the past and had a colonoscopy that took place on Dec 12, 2018. They said back then I had hemorrhoids but were too small to have anything done with them. But at that time the only symptom I had was bleeding. Nothing else like i am experiencing now  What is the primary reason for your visit?: Other      Chief Complaint   Patient presents with   • GI Problem     Rectal bleeding X 1 month, happened in 2018 also; abdominal pain/pressure R sided, rectal pain, unable to pass a \"regular bowel movement,\" decreased appetite, weight gain     Subjective   Ada Perry is a 27 y.o. female.     History of Present Illness     Patient presents with rectal bleeding x1 " month.  She had a previous episode of this in 2018.  She had a colonoscopy in 2018 in which she had some hemorrhoids.  This episode is different as she is having rectal pressure and pain.  States that she is straining 5 to 10 minutes to have a bowel movement.  There is a lot of blood when she wipes, sometimes mixed in with her stool and in water of toilet.  She is constipated.  She also has loose stools and diarrhea.  Admits to her stool being black at times.  She does not have pain when she sits, just with bowel movements. The rectal pain/pressure will come on randomly/intermittently. She has been having some right upper quadrant pain as well after meals. Significant family history of gallbladder issues.  She is pretty much eating a normal diet, admits to fries, sandwiches, normal breakfast foods.  She is not having any vomiting, keeps food and drink down. Does have nausea and reflux. She is drinking 3-4 bottles of water a day.  She is not taking anything for constipation. Current pain 0/10 in office.     With these issues, her anxiety has worsened.  She will be sitting in class and feeling she has a ball in her throat and this makes her feel short of breath and panicky.  She has always had anxiety and has dealt without medication or counseling, but she has been unable to control it lately.  She has tried counseling and does not want to do that again.  She is interested in seeing psychiatry and starting a daily medication.  Unable to tell me the medication she has tried in the past.  Denies SI, more anxiety.    The following portions of the patient's history were reviewed and updated as appropriate: allergies, current medications, past family history, past medical history, past social history, past surgical history and problem list.    Review of Systems   Constitutional: Negative for chills, fatigue and fever.   Eyes: Negative for visual disturbance.   Respiratory: Negative.    Cardiovascular: Negative.   "  Gastrointestinal: Positive for abdominal pain, anal bleeding, blood in stool, constipation, diarrhea, nausea and rectal pain. Negative for abdominal distention and vomiting.   Genitourinary: Negative.    Musculoskeletal: Negative for myalgias.   Neurological: Negative.    Psychiatric/Behavioral: Negative for agitation, behavioral problems, confusion, decreased concentration, dysphoric mood, hallucinations, self-injury, sleep disturbance and suicidal ideas. The patient is nervous/anxious and is hyperactive.    All other systems reviewed and are negative.      Objective   /80 (BP Location: Right arm, Patient Position: Sitting, Cuff Size: Large Adult)   Pulse 107   Temp 98.4 °F (36.9 °C) (Temporal)   Ht 160 cm (63\")   Wt 133 kg (293 lb 4 oz)   LMP 01/30/2023 Comment: had period for 2 days, first one since August 2020  SpO2 96%   BMI 51.95 kg/m²   Body mass index is 51.95 kg/m².  Physical Exam  Constitutional:       General: She is not in acute distress.     Appearance: Normal appearance. She is obese. She is not ill-appearing, toxic-appearing or diaphoretic.   HENT:      Head: Normocephalic and atraumatic.      Right Ear: External ear normal.      Left Ear: External ear normal.      Nose: Nose normal.   Eyes:      General: No scleral icterus.     Extraocular Movements: Extraocular movements intact.   Cardiovascular:      Rate and Rhythm: Normal rate and regular rhythm.   Pulmonary:      Effort: Pulmonary effort is normal.      Breath sounds: Normal breath sounds.   Abdominal:      General: Bowel sounds are normal. There is no distension.      Palpations: Abdomen is soft. There is no hepatomegaly or mass.      Tenderness: There is generalized abdominal tenderness and tenderness in the right upper quadrant. There is no right CVA tenderness, left CVA tenderness, guarding or rebound. Negative signs include Fernandez's sign and McBurney's sign.      Hernia: No hernia is present.   Musculoskeletal:         " General: Normal range of motion.      Cervical back: Normal range of motion.   Skin:     General: Skin is warm and dry.      Coloration: Skin is not jaundiced.      Findings: No rash.   Neurological:      General: No focal deficit present.      Mental Status: She is alert and oriented to person, place, and time.   Psychiatric:         Mood and Affect: Mood is anxious.         Speech: Speech is rapid and pressured.         Assessment & Plan   Ada Perry is here today and the following problems have been addressed:      Diagnoses and all orders for this visit:    1. Rectal bleeding (Primary)  -     Ambulatory Referral to Gastroenterology  -     Ambulatory Referral For Screening Colonoscopy    2. Rectal pain  -     Ambulatory Referral to Gastroenterology  -     Ambulatory Referral For Screening Colonoscopy    3. Melena  -     Ambulatory Referral to Gastroenterology  -     Ambulatory Referral For Screening Colonoscopy    4. Alternating constipation and diarrhea  -     Ambulatory Referral to Gastroenterology  -     Ambulatory Referral For Screening Colonoscopy    5. RUQ pain  -     Ambulatory Referral to Gastroenterology  -     Ambulatory Referral For Screening Colonoscopy  -     US Gallbladder    6. Anxiety  -     sertraline (Zoloft) 25 MG tablet; Take 1 tablet by mouth Daily.  Dispense: 90 tablet; Refill: 0  -     Ambulatory Referral to Psychiatry    7. Need for second dose of COVID-19 vaccine  -     COVID-19 Vaccine (Pfizer) Gray Cap    Discussion Summary  Recommend 2-3 L of water a day, MiraLAX twice daily, bland diet (bananas, applesauce, rice, toast, broths) nothing spicy, greasy, carbonated, caffeinated.  Due to rectal bleeding, rectal pain, melena, bright blood in stools, will refer to general surgery for quicker evaluation and likely colonoscopy/EGD. Will place referral to gastroenterology to establish care and follow-up after evaluation by general surgery.  If any fever, severe pain, nausea/vomiting, copious  amount of blood go to the emergency room.  Patient is mildly tender to right upper quadrant on exam and states it hurts there after meals.  She has a significiant family history of gallbladder disease in mother and maternal grandmother. Ordered ultrasound of gallbladder to be scheduled, but do not think gallbladder is culprit to all symptoms she is having but could be contributing.     Initiate Zoloft 25 mg daily.  Discussed that 50 mg is the starting dose, she would rather start low and slow and titrate up if needed.  Take once a day at the same time.  Medication takes 6 to 8 weeks to become fully effective.  Discussed the initial side effects in the first 1 to 2 weeks and these typically do resolve.  Follow-up in 8 weeks regarding anxiety.  Referral to psychiatry.  Declines counseling.    Update COVID-19 vaccine today, this is her second dose.  Tolerated well, VIS given.    I spent 38 minutes caring for Ada Perry on this date of service. This time includes time spent by me in the following activities: preparing for the visit, reviewing tests, performing a medically appropriate examination and/or evaluation, counseling and educating the patient/family/caregiver, ordering medications, tests, or procedures and documenting information in the medical record.    Follow Up   Return in about 8 weeks (around 3/31/2023) for anxiety.  Patient was given instructions and counseling regarding her condition or for health maintenance advice. Please see specific information pulled into the AVS if appropriate.     Mare LOPEZ  [facility] [place]

## 2023-02-09 NOTE — PROGRESS NOTES
Patient: Ada Perry    YOB: 1995    Date: 02/17/2023    Primary Care Provider: Amanda Ibarra APRN    No chief complaint on file.      SUBJECTIVE:    History of present illness:  I saw the patient in the office today as a consultation for evaluation and treatment of rectal pain and bleeding. She states that she feels an intense stabbing pain in her anus at random times. With the sensation to use the bathroom she gets immense pressure all of sudden and then not a lot of stool comes out. Stool is always soft/diarrhea. Patient had a colonoscopy on Dec 12, 2018. They said that she has hemorrhoids but were too small to have anything done with them. Has rectal pain when not having bowel movement, bleeding when passing stool, changes in bowel movement (smell and having constipation as well as diarrhea). She also reports her stools have been dark and with blood mixed in. She states when she eats, she feels like food gets stuck and she also reports reflux.     The following portions of the patient's history were reviewed and updated as appropriate: allergies, current medications, past family history, past medical history, past social history, past surgical history and problem list.    Review of Systems   Constitutional: Negative for chills, fever and unexpected weight change.   HENT: Negative for hearing loss, trouble swallowing and voice change.    Eyes: Negative for visual disturbance.   Respiratory: Negative for apnea, cough, chest tightness, shortness of breath and wheezing.    Cardiovascular: Negative for chest pain, palpitations and leg swelling.   Gastrointestinal: Positive for abdominal pain, anal bleeding, constipation, diarrhea and rectal pain. Negative for abdominal distention, blood in stool, nausea and vomiting.   Endocrine: Negative for cold intolerance and heat intolerance.   Genitourinary: Negative for difficulty urinating, dysuria and flank pain.   Musculoskeletal: Negative for back  pain and gait problem.   Skin: Negative for color change, rash and wound.   Neurological: Negative for dizziness, syncope, speech difficulty, weakness, light-headedness, numbness and headaches.   Hematological: Negative for adenopathy. Does not bruise/bleed easily.   Psychiatric/Behavioral: Negative for confusion. The patient is not nervous/anxious.        History:  Past Medical History:   Diagnosis Date   • Clotting disorder (HCC)    • Hypertension    • Patient denies medical problems    • Rectal bleeding        Past Surgical History:   Procedure Laterality Date   • COLONOSCOPY     • TONSILLECTOMY     • WISDOM TOOTH EXTRACTION         Family History   Problem Relation Age of Onset   • Pulmonary embolism Mother    • Lung cancer Mother    • Autoimmune disease Mother    • Desha's disease Mother    • Hypothyroidism Mother    • Adrenal disorder Mother    • Cancer Mother    • Diabetes Father    • Heart attack Father    • Heart disease Father    • Cancer Father         Skin   • Other Sister         MTFHR   • Endometrial cancer Sister    • Polycystic ovary syndrome Sister    • Diabetes Brother    • Osteoporosis Maternal Grandmother    • Coronary artery disease Maternal Grandfather    • Ovarian cancer Paternal Grandmother    • Breast cancer Paternal Grandmother    • Diabetes Paternal Grandfather    • Breast cancer Paternal Aunt    • Ovarian cancer Maternal Aunt        Social History     Tobacco Use   • Smoking status: Never   • Smokeless tobacco: Never   Vaping Use   • Vaping Use: Never used   Substance Use Topics   • Alcohol use: Not Currently     Comment: 1-2 drinks monthly   • Drug use: Never       Allergies:  No Known Allergies    Medications:    Current Outpatient Medications:   •  sertraline (Zoloft) 25 MG tablet, Take 1 tablet by mouth Daily., Disp: 90 tablet, Rfl: 0  •  vitamin D (ERGOCALCIFEROL) 1.25 MG (24555 UT) capsule capsule, Take 1 capsule by mouth 1 (One) Time Per Week. (Patient taking differently: Take  "50,000 Units by mouth 1 (One) Time Per Week. Patient states that she has a hard time remembering to take this medication once weekly), Disp: 12 capsule, Rfl: 0    OBJECTIVE:    Vital Signs:   Vitals:    02/17/23 0912   BP: 132/86   BP Location: Left arm   Patient Position: Sitting   Cuff Size: Adult   Pulse: 101   Temp: 97.9 °F (36.6 °C)   TempSrc: Temporal   SpO2: 97%   Weight: 131 kg (288 lb)   Height: 160 cm (63\")       Physical Exam:   General Appearance:    Alert, cooperative, in no acute distress   Head:    Normocephalic, without obvious abnormality, atraumatic   Eyes:            Lids and lashes normal, conjunctivae and sclerae normal, no   icterus, no pallor, corneas clear, PERRL   Ears:    Ears appear intact with no abnormalities noted   Throat:   No oral lesions, no thrush, oral mucosa moist   Neck:   No adenopathy, supple, trachea midline, no thyromegaly,  no JVD   Lungs:     Clear to auscultation,respirations regular, even and                  unlabored    Heart:    Regular rhythm and normal rate, normal S1 and S2, no            murmur   Abdomen:     no masses, no organomegaly, soft non-tender, non-distended, no guarding, there is no evidence of tenderness   Extremities:   Moves all extremities well, no edema, no cyanosis, no             redness   Pulses:   Pulses palpable and equal bilaterally   Skin:   No bleeding, bruising or rash   Lymph nodes:   No palpable adenopathy   Neurologic:   Cranial nerves 2 - 12 grossly intact, sensation intact      Results Review:   I reviewed the patient's new clinical results.    Review of Systems was reviewed and confirmed as accurate as documented by the MA.    ASSESSMENT/PLAN:    1. Rectal bleeding    2. Melena    3. Nausea        I recommend a colonoscopy and EGD for further evaluation. The procedure was explained as well as the risks which include but are not limited to bleeding, infection, perforation, abdominal pain etc. The patient understands these risks and the " procedure and wishes to proceed. I also recommend conservative management for her hemorrhoids. I recommend avoiding sitting on the toilet for prolonged periods of time and weight loss. Education was provided via [employer].     Electronically signed by Jordyn Donaldson DO  02/17/23 09:49 EST    Answers for HPI/ROS submitted by the patient on 2/17/2023  Please describe your symptoms.: Having rectal issues and was needing a colonoscopy as well as abdominal pain and other issues  Have you had these symptoms before?: Yes  How long have you been having these symptoms?: Greater than 2 weeks  Please list any medications you are currently taking for this condition.: Zoloft , Vitamin D  What is the primary reason for your visit?: Other

## 2023-02-17 ENCOUNTER — OFFICE VISIT (OUTPATIENT)
Dept: SURGERY | Facility: CLINIC | Age: 28
End: 2023-02-17
Payer: COMMERCIAL

## 2023-02-17 VITALS
HEIGHT: 63 IN | BODY MASS INDEX: 51.03 KG/M2 | WEIGHT: 288 LBS | TEMPERATURE: 97.9 F | OXYGEN SATURATION: 97 % | DIASTOLIC BLOOD PRESSURE: 86 MMHG | HEART RATE: 101 BPM | SYSTOLIC BLOOD PRESSURE: 132 MMHG

## 2023-02-17 DIAGNOSIS — K62.5 RECTAL BLEEDING: Primary | ICD-10-CM

## 2023-02-17 DIAGNOSIS — R11.0 NAUSEA: ICD-10-CM

## 2023-02-17 DIAGNOSIS — K21.9 GASTROESOPHAGEAL REFLUX DISEASE, UNSPECIFIED WHETHER ESOPHAGITIS PRESENT: ICD-10-CM

## 2023-02-17 DIAGNOSIS — K92.1 MELENA: ICD-10-CM

## 2023-02-17 PROCEDURE — 99203 OFFICE O/P NEW LOW 30 MIN: CPT | Performed by: STUDENT IN AN ORGANIZED HEALTH CARE EDUCATION/TRAINING PROGRAM

## 2023-02-17 RX ORDER — SODIUM CHLORIDE 9 MG/ML
40 INJECTION, SOLUTION INTRAVENOUS AS NEEDED
Status: CANCELLED | OUTPATIENT
Start: 2023-02-17

## 2023-02-17 RX ORDER — BISACODYL 5 MG
TABLET, DELAYED RELEASE (ENTERIC COATED) ORAL
Qty: 4 TABLET | Refills: 0 | Status: SHIPPED | OUTPATIENT
Start: 2023-02-17

## 2023-02-17 RX ORDER — SODIUM CHLORIDE, SODIUM LACTATE, POTASSIUM CHLORIDE, CALCIUM CHLORIDE 600; 310; 30; 20 MG/100ML; MG/100ML; MG/100ML; MG/100ML
50 INJECTION, SOLUTION INTRAVENOUS CONTINUOUS
Status: CANCELLED | OUTPATIENT
Start: 2023-02-17

## 2023-02-17 RX ORDER — POLYETHYLENE GLYCOL 3350 17 G/17G
POWDER, FOR SOLUTION ORAL
Qty: 238 EACH | Refills: 0 | Status: SHIPPED | OUTPATIENT
Start: 2023-02-17

## 2023-02-17 RX ORDER — SODIUM CHLORIDE 0.9 % (FLUSH) 0.9 %
10 SYRINGE (ML) INJECTION EVERY 12 HOURS SCHEDULED
Status: CANCELLED | OUTPATIENT
Start: 2023-02-17

## 2023-02-17 RX ORDER — SODIUM CHLORIDE 0.9 % (FLUSH) 0.9 %
10 SYRINGE (ML) INJECTION AS NEEDED
Status: CANCELLED | OUTPATIENT
Start: 2023-02-17

## 2023-02-21 PROBLEM — K62.5 RECTAL BLEEDING: Status: ACTIVE | Noted: 2023-02-21

## 2023-04-05 ENCOUNTER — HOSPITAL ENCOUNTER (OUTPATIENT)
Dept: ULTRASOUND IMAGING | Facility: HOSPITAL | Age: 28
Discharge: HOME | End: 2023-04-05
Admitting: NURSE PRACTITIONER
Payer: COMMERCIAL

## 2023-04-05 PROCEDURE — 76705 ECHO EXAM OF ABDOMEN: CPT

## 2023-04-21 ENCOUNTER — TELEPHONE (OUTPATIENT)
Dept: SURGERY | Facility: CLINIC | Age: 28
End: 2023-04-21
Payer: COMMERCIAL

## 2023-04-26 ENCOUNTER — TELEPHONE (OUTPATIENT)
Dept: SURGERY | Facility: CLINIC | Age: 28
End: 2023-04-26
Payer: COMMERCIAL

## 2023-04-26 NOTE — TELEPHONE ENCOUNTER
Caller: DESIRE BACK   Relationship: SELF   Best call back number:[phone]    Who are you requesting to speak with (clinical staff, provider,  specific staff member): SURGERY SCHEDULER     What was the call regarding: PT IS SCHEDULED FOR COLONOSCOPY TODAY(04/26). SHE STATES SHE WASN'T ABLE TO HAVE A BOWEL MOVEMENT AND WOULD LIKE TO R/S ASAP.     Do you require a callback: YES

## 2023-05-01 DIAGNOSIS — F41.9 ANXIETY: ICD-10-CM

## 2023-05-01 RX ORDER — SERTRALINE HYDROCHLORIDE 25 MG/1
TABLET, FILM COATED ORAL
Qty: 90 TABLET | Refills: 1 | Status: SHIPPED | OUTPATIENT
Start: 2023-05-01

## 2023-05-08 ENCOUNTER — LAB (OUTPATIENT)
Dept: LAB | Facility: HOSPITAL | Age: 28
End: 2023-05-08
Payer: COMMERCIAL

## 2023-05-08 ENCOUNTER — OFFICE VISIT (OUTPATIENT)
Dept: GASTROENTEROLOGY | Facility: CLINIC | Age: 28
End: 2023-05-08
Payer: COMMERCIAL

## 2023-05-08 VITALS
SYSTOLIC BLOOD PRESSURE: 122 MMHG | WEIGHT: 286 LBS | OXYGEN SATURATION: 97 % | DIASTOLIC BLOOD PRESSURE: 80 MMHG | HEART RATE: 104 BPM | BODY MASS INDEX: 49.09 KG/M2

## 2023-05-08 DIAGNOSIS — R11.2 NAUSEA AND VOMITING, UNSPECIFIED VOMITING TYPE: ICD-10-CM

## 2023-05-08 DIAGNOSIS — E66.01 CLASS 3 SEVERE OBESITY WITH BODY MASS INDEX (BMI) OF 45.0 TO 49.9 IN ADULT, UNSPECIFIED OBESITY TYPE, UNSPECIFIED WHETHER SERIOUS COMORBIDITY PRESENT: ICD-10-CM

## 2023-05-08 DIAGNOSIS — R19.7 DIARRHEA, UNSPECIFIED TYPE: ICD-10-CM

## 2023-05-08 DIAGNOSIS — R74.01 ELEVATED ALT MEASUREMENT: ICD-10-CM

## 2023-05-08 DIAGNOSIS — R19.7 DIARRHEA, UNSPECIFIED TYPE: Primary | ICD-10-CM

## 2023-05-08 DIAGNOSIS — R10.11 RUQ ABDOMINAL PAIN: ICD-10-CM

## 2023-05-08 DIAGNOSIS — K62.5 RECTAL BLEEDING: ICD-10-CM

## 2023-05-08 LAB
ALBUMIN SERPL-MCNC: 4.7 G/DL (ref 3.5–5.2)
ALBUMIN/GLOB SERPL: 1.7 G/DL
ALP SERPL-CCNC: 57 U/L (ref 39–117)
ALT SERPL W P-5'-P-CCNC: 44 U/L (ref 1–33)
ANION GAP SERPL CALCULATED.3IONS-SCNC: 9 MMOL/L (ref 5–15)
AST SERPL-CCNC: 31 U/L (ref 1–32)
BASOPHILS # BLD AUTO: 0.09 10*3/MM3 (ref 0–0.2)
BASOPHILS NFR BLD AUTO: 1 % (ref 0–1.5)
BILIRUB SERPL-MCNC: 0.3 MG/DL (ref 0–1.2)
BUN SERPL-MCNC: 11 MG/DL (ref 6–20)
BUN/CREAT SERPL: 14.9 (ref 7–25)
CALCIUM SPEC-SCNC: 9.4 MG/DL (ref 8.6–10.5)
CHLORIDE SERPL-SCNC: 106 MMOL/L (ref 98–107)
CO2 SERPL-SCNC: 24 MMOL/L (ref 22–29)
CREAT SERPL-MCNC: 0.74 MG/DL (ref 0.57–1)
DEPRECATED RDW RBC AUTO: 41.9 FL (ref 37–54)
EGFRCR SERPLBLD CKD-EPI 2021: 115.3 ML/MIN/1.73
EOSINOPHIL # BLD AUTO: 0.24 10*3/MM3 (ref 0–0.4)
EOSINOPHIL NFR BLD AUTO: 2.7 % (ref 0.3–6.2)
ERYTHROCYTE [DISTWIDTH] IN BLOOD BY AUTOMATED COUNT: 12.8 % (ref 12.3–15.4)
GLOBULIN UR ELPH-MCNC: 2.7 GM/DL
GLUCOSE SERPL-MCNC: 93 MG/DL (ref 65–99)
HCT VFR BLD AUTO: 43.5 % (ref 34–46.6)
HGB BLD-MCNC: 15 G/DL (ref 12–15.9)
IGA1 MFR SER: 257 MG/DL (ref 70–400)
IMM GRANULOCYTES # BLD AUTO: 0.03 10*3/MM3 (ref 0–0.05)
IMM GRANULOCYTES NFR BLD AUTO: 0.3 % (ref 0–0.5)
LYMPHOCYTES # BLD AUTO: 3.16 10*3/MM3 (ref 0.7–3.1)
LYMPHOCYTES NFR BLD AUTO: 35.4 % (ref 19.6–45.3)
MCH RBC QN AUTO: 30.8 PG (ref 26.6–33)
MCHC RBC AUTO-ENTMCNC: 34.5 G/DL (ref 31.5–35.7)
MCV RBC AUTO: 89.3 FL (ref 79–97)
MONOCYTES # BLD AUTO: 0.55 10*3/MM3 (ref 0.1–0.9)
MONOCYTES NFR BLD AUTO: 6.2 % (ref 5–12)
NEUTROPHILS NFR BLD AUTO: 4.86 10*3/MM3 (ref 1.7–7)
NEUTROPHILS NFR BLD AUTO: 54.4 % (ref 42.7–76)
NRBC BLD AUTO-RTO: 0 /100 WBC (ref 0–0.2)
PLATELET # BLD AUTO: 297 10*3/MM3 (ref 140–450)
PMV BLD AUTO: 10.5 FL (ref 6–12)
POTASSIUM SERPL-SCNC: 4.1 MMOL/L (ref 3.5–5.2)
PROT SERPL-MCNC: 7.4 G/DL (ref 6–8.5)
RBC # BLD AUTO: 4.87 10*6/MM3 (ref 3.77–5.28)
SODIUM SERPL-SCNC: 139 MMOL/L (ref 136–145)
T4 FREE SERPL-MCNC: 1.04 NG/DL (ref 0.93–1.7)
TSH SERPL DL<=0.05 MIU/L-ACNC: 2.52 UIU/ML (ref 0.27–4.2)
WBC NRBC COR # BLD: 8.93 10*3/MM3 (ref 3.4–10.8)

## 2023-05-08 PROCEDURE — 84439 ASSAY OF FREE THYROXINE: CPT

## 2023-05-08 PROCEDURE — 82784 ASSAY IGA/IGD/IGG/IGM EACH: CPT

## 2023-05-08 PROCEDURE — 99214 OFFICE O/P EST MOD 30 MIN: CPT | Performed by: PHYSICIAN ASSISTANT

## 2023-05-08 PROCEDURE — 80050 GENERAL HEALTH PANEL: CPT

## 2023-05-08 PROCEDURE — 1159F MED LIST DOCD IN RCRD: CPT | Performed by: PHYSICIAN ASSISTANT

## 2023-05-08 PROCEDURE — 1160F RVW MEDS BY RX/DR IN RCRD: CPT | Performed by: PHYSICIAN ASSISTANT

## 2023-05-08 PROCEDURE — 86364 TISS TRNSGLTMNASE EA IG CLAS: CPT

## 2023-05-08 PROCEDURE — 36415 COLL VENOUS BLD VENIPUNCTURE: CPT

## 2023-05-08 NOTE — PROGRESS NOTES
New Patient Consult      Date: 2023   Patient Name: Ada Perry  MRN: [id number]  : 1995     Primary Care Provider: Amanda Ibarra APRN  Referring Provider: Satnam    Chief Complaint   Patient presents with   • Abdominal Pain   • Diarrhea   • Rectal Bleeding     History of Present Illness: Ada Perry is a 27 y.o. female who is here today as a consultation with Gastroenterology for evaluation of Abdominal Pain, Diarrhea, and Rectal Bleeding.    She reports a sudden change in bowel habits approximately 6 months ago, she previously had regular soft formed bowel movements once daily without any problems.  Since 6 months ago, she has been having severe watery diarrhea fluctuating with hard stools.  She reports predominantly loose watery stool.  She has rectal bleeding with bowel movements.  Also with rectal pain during bowel movement.  She did see general surgery and it was recommended that she complete a colonoscopy.  She was planning to do this and drink the prep but that exacerbated all of her symptoms plus because of severe nausea and vomiting.  She canceled that procedure.    She will also have intermittent random surges of severe anal pain, described as a stabbing pain.  Also having intermittent reflux symptoms which is relieved by putting pressure on her lower neck region.    Today, complains of nausea currently.  Still has intermittent vomiting.  Still with rectal bleeding.  Abdominal pain is located in the right upper quadrant.  She has worse pain during a bowel movement.  Has not used any over-the-counter hemorrhoid treatments.    She did have a prior colonoscopy in , had in [place], reported as normal other than hemorrhoids.  Her mother had colitis, unsure of the details.  No family history of colon cancer.    Denies any prior history of liver disease.  No alcoholism.  She does not smoke.  She reports having difficulty losing weight and though she is active, walking 3 to  4 miles a day.  Her PCP told her recently that her B12 and vitamin D are low.    Subjective      Past Medical History:   Diagnosis Date   • Clotting disorder    • Hypertension    • Patient denies medical problems    • Rectal bleeding      Past Surgical History:   Procedure Laterality Date   • COLONOSCOPY     • TONSILLECTOMY     • WISDOM TOOTH EXTRACTION       Family History   Problem Relation Age of Onset   • Pulmonary embolism Mother    • Lung cancer Mother    • Autoimmune disease Mother    • [place]'s disease Mother    • Hypothyroidism Mother    • Adrenal disorder Mother    • Cancer Mother    • Ulcerative colitis Mother    • Diabetes Father    • Heart attack Father    • Heart disease Father    • Cancer Father         Skin   • Other Sister         MTFHR   • Endometrial cancer Sister    • Polycystic ovary syndrome Sister    • Diabetes Brother    • Ovarian cancer Maternal Aunt    • Breast cancer Paternal Aunt    • Osteoporosis Maternal Grandmother    • Coronary artery disease Maternal Grandfather    • Ovarian cancer Paternal Grandmother    • Breast cancer Paternal Grandmother    • Diabetes Paternal Grandfather      Social History     Socioeconomic History   • Marital status: Single   Tobacco Use   • Smoking status: Never   • Smokeless tobacco: Never   Vaping Use   • Vaping Use: Some days   • Substances: Nicotine, Flavoring   • Devices: Disposable   Substance and Sexual Activity   • Alcohol use: Not Currently     Comment: 1-2 drinks monthly   • Drug use: Never   • Sexual activity: Yes     Partners: Male     Birth control/protection: Condom, Abstinence       Current Outpatient Medications:   •  sertraline (ZOLOFT) 25 MG tablet, TAKE 1 TABLET BY MOUTH EVERY DAY, Disp: 90 tablet, Rfl: 1  •  vitamin D (ERGOCALCIFEROL) 1.25 MG (49410 UT) capsule capsule, Take 1 capsule by mouth 1 (One) Time Per Week. (Patient taking differently: Take 1 capsule by mouth 1 (One) Time Per Week. Patient states that she has a hard time  remembering to take this medication once weekly), Disp: 12 capsule, Rfl: 0    No Known Allergies    The following portions of the patient's history were reviewed and updated as appropriate: allergies, current medications, past family history, past medical history, past social history, past surgical history and problem list.    Objective     Physical Exam  Vitals reviewed.   Constitutional:       General: She is not in acute distress.     Appearance: Normal appearance. She is well-developed. She is obese. She is not ill-appearing or diaphoretic.   HENT:      Head: Normocephalic and atraumatic.      Right Ear: External ear normal.      Left Ear: External ear normal.      Nose: Nose normal.   Eyes:      General: No scleral icterus.        Right eye: No discharge.         Left eye: No discharge.      Conjunctiva/sclera: Conjunctivae normal.   Neck:      Vascular: No JVD.   Cardiovascular:      Rate and Rhythm: Normal rate and regular rhythm.      Heart sounds: Normal heart sounds. No murmur heard.    No friction rub. No gallop.   Pulmonary:      Effort: Pulmonary effort is normal. No respiratory distress.      Breath sounds: Normal breath sounds. No wheezing or rales.   Chest:      Chest wall: No tenderness.   Abdominal:      General: Bowel sounds are normal. There is no distension.      Palpations: Abdomen is soft. There is no mass.      Tenderness: There is abdominal tenderness (RUQ and RLQ, moderate). There is no guarding.   Musculoskeletal:         General: No deformity. Normal range of motion.      Cervical back: Normal range of motion.   Skin:     General: Skin is warm and dry.      Findings: No erythema or rash.   Neurological:      Mental Status: She is alert and oriented to person, place, and time.      Coordination: Coordination normal.   Psychiatric:         Behavior: Behavior normal.         Thought Content: Thought content normal.         Judgment: Judgment normal.      Comments: Anxious affect          Vitals:    05/08/23 1058   BP: 122/80   Pulse: 104   SpO2: 97%   Weight: 130 kg (286 lb)     Results Review:   I have reviewed the patient's new clinical and imaging results.    Office Visit on 09/28/2022   Component Date Value Ref Range Status   • Glucose 09/28/2022 98  65 - 99 mg/dL Final   • BUN 09/28/2022 14  6 - 20 mg/dL Final   • Creatinine 09/28/2022 0.75  0.57 - 1.00 mg/dL Final   • EGFR Result 09/28/2022 113.5  >60.0 mL/min/1.73 Final    Comment: National Kidney Foundation and American Society of  Nephrology (ASN) Task Force recommended calculation based  on the Chronic Kidney Disease Epidemiology Collaboration  (CKD-EPI) equation refit without adjustment for race.  GFR Normal >60  Chronic Kidney Disease <60  Kidney Failure <15     • BUN/Creatinine Ratio 09/28/2022 18.7  7.0 - 25.0 Final   • Sodium 09/28/2022 144  136 - 145 mmol/L Final   • Potassium 09/28/2022 4.7  3.5 - 5.2 mmol/L Final   • Chloride 09/28/2022 107  98 - 107 mmol/L Final   • Total CO2 09/28/2022 25.9  22.0 - 29.0 mmol/L Final   • Calcium 09/28/2022 9.4  8.6 - 10.5 mg/dL Final   • Total Protein 09/28/2022 6.3  6.0 - 8.5 g/dL Final   • Albumin 09/28/2022 4.60  3.50 - 5.20 g/dL Final   • Globulin 09/28/2022 1.7  gm/dL Final   • A/G Ratio 09/28/2022 2.7  g/dL Final   • Total Bilirubin 09/28/2022 0.4  0.0 - 1.2 mg/dL Final   • Alkaline Phosphatase 09/28/2022 62  39 - 117 U/L Final   • AST (SGOT) 09/28/2022 27  1 - 32 U/L Final   • ALT (SGPT) 09/28/2022 42 (H)  1 - 33 U/L Final   • Hemoglobin A1C 09/28/2022 5.30  4.80 - 5.60 % Final   • Total Cholesterol 09/28/2022 189  0 - 200 mg/dL Final   • Triglycerides 09/28/2022 191 (H)  0 - 150 mg/dL Final   • HDL Cholesterol 09/28/2022 41  40 - 60 mg/dL Final   • VLDL Cholesterol [place] 09/28/2022 34  5 - 40 mg/dL Final   • LDL Chol Calc ([facility]) 09/28/2022 114 (H)  0 - 100 mg/dL Final   • TIBC 09/28/2022 367  mcg/dL Final   • UIBC 09/28/2022 255  112 - 346 mcg/dL Final   • Iron 09/28/2022 112  37 -  145 mcg/dL Final   • Iron Saturation 09/28/2022 31  20 - 50 % Final   • Ferritin 09/28/2022 68.00  13.00 - 150.00 ng/mL Final    Results may be falsely decreased if patient taking Biotin.   • WBC 09/28/2022 7.07  3.40 - 10.80 10*3/mm3 Final   • RBC 09/28/2022 4.77  3.77 - 5.28 10*6/mm3 Final   • Hemoglobin 09/28/2022 14.8  12.0 - 15.9 g/dL Final   • Hematocrit 09/28/2022 42.2  34.0 - 46.6 % Final   • MCV 09/28/2022 88.5  79.0 - 97.0 fL Final   • MCH 09/28/2022 31.0  26.6 - 33.0 pg Final   • MCHC 09/28/2022 35.1  31.5 - 35.7 g/dL Final   • RDW 09/28/2022 12.4  12.3 - 15.4 % Final   • Platelets 09/28/2022 276  140 - 450 10*3/mm3 Final   • Neutrophil Rel % 09/28/2022 52.5  42.7 - 76.0 % Final   • Lymphocyte Rel % 09/28/2022 36.8  19.6 - 45.3 % Final   • Monocyte Rel % 09/28/2022 7.5  5.0 - 12.0 % Final   • Eosinophil Rel % 09/28/2022 2.4  0.3 - 6.2 % Final   • Basophil Rel % 09/28/2022 0.7  0.0 - 1.5 % Final   • Neutrophils Absolute 09/28/2022 3.71  1.70 - 7.00 10*3/mm3 Final   • Lymphocytes Absolute 09/28/2022 2.60  0.70 - 3.10 10*3/mm3 Final   • Monocytes Absolute 09/28/2022 0.53  0.10 - 0.90 10*3/mm3 Final   • Eosinophils Absolute 09/28/2022 0.17  0.00 - 0.40 10*3/mm3 Final   • Basophils Absolute 09/28/2022 0.05  0.00 - 0.20 10*3/mm3 Final   • Immature Granulocyte Rel % 09/28/2022 0.1  0.0 - 0.5 % Final   • Immature Grans Absolute 09/28/2022 0.01  0.00 - 0.05 10*3/mm3 Final   • nRBC 09/28/2022 0.0  0.0 - 0.2 /100 WBC Final   • Vitamin B-12 09/28/2022 343  211 - 946 pg/mL Final    Results may be falsely increased if patient taking Biotin.   • 25 Hydroxy, Vitamin D 09/28/2022 19.4 (L)  30.0 - 100.0 ng/ml Final   • TSH 09/28/2022 2.230  0.450 - 4.500 uIU/mL Final   • T4, Total 09/28/2022 8.6  4.5 - 12.0 ug/dL Final   • T3 Uptake 09/28/2022 24  24 - 39 % Final   • Free Thyroxine Index 09/28/2022 2.1  1.2 - 4.9 Final   • Thyroid Peroxidase Antibody 09/28/2022 <8  0 - 34 IU/mL Final      US Gallbladder  Result Date:  4/5/2023  Normal right upper quadrant ultrasound.      Assessment / Plan      1. Diarrhea, unspecified type  2. Rectal bleeding  3. RUQ abdominal pain  4. Nausea and vomiting, unspecified vomiting type  Approximately 6 months ago, she had a sudden change in bowel habits from normal daily stools to severe diarrhea with rectal bleeding.  She also has right upper quadrant abdominal pain.  She tried taking colonoscopy prep for general surgery in order to have the procedure but had severe nausea and vomiting and could not proceed.  Ultrasound gallbladder April 2023 normal.  Basic labs September 2022 unremarkable other than mildly elevated ALT at 42 and low vitamin D.  Her mother had colitis, unsure of details.  Based on history, suspect irritable bowel syndrome with diarrhea but also infection and IBD to rule out.    Stool testing  Labs today  We will recommend further based on these results  We will consider Xifaxan therapy for suspected IBS-D  Should have EGD and colonoscopy arranged in the future, she would like to defer for now    - CBC Auto Differential; Future  - Comprehensive Metabolic Panel; Future  - T4, Free; Future  - TSH; Future  - Tissue Transglutaminase, IgA; Future  - IgA; Future    - Calprotectin, Fecal - Stool, Per Rectum; Future  - Clostridioides difficile Toxin, PCR - Stool, Per Rectum; Future  - Gastrointestinal Panel, PCR - Stool, Per Rectum; Future  - Pancreatic Elastase, Fecal - Stool, Per Rectum; Future    5. Class 3 severe obesity with body mass index (BMI) of 45.0 to 49.9 in adult, unspecified obesity type, unspecified whether serious comorbidity present  6. Elevated ALT  Her BMI is 49.  No history of liver disease.  Ultrasound gallbladder April 2023 normal.  Labs 7/2022 show mildly elevated ALT at 42.  She is at risk of development of fatty liver disease.    Mediterranean diet recommended for long-term  Due to current illness, no diet changes recommended at this time  Will monitor, repeat CMP  today      Follow Up:   Return in about 3 months (around 8/8/2023) for recheck abdominal pain, discussion of results.  She will be notified of any abnormal results.    Aida Anderson PA-C  [facility] [place]  5/8/2023  12:16 EDT    Dictated Utilizing Dragon Dictation: Part of this note may be an electronic transcription/translation of spoken language to printed text using the Dragon Dictation System.

## 2023-05-09 ENCOUNTER — PATIENT ROUNDING (BHMG ONLY) (OUTPATIENT)
Dept: GASTROENTEROLOGY | Facility: CLINIC | Age: 28
End: 2023-05-09
Payer: COMMERCIAL

## 2023-05-09 LAB — TTG IGA SER-ACNC: <2 U/ML (ref 0–3)

## 2023-05-09 NOTE — PROGRESS NOTES
May 9, 2023    Hello, may I speak with Ada Perry?    My name is Rosanan     I am  with [facility]  [address]  [place] [facility] [facility] [address] [phone].    Before we get started may I verify your date of birth? 1995    I am calling to officially welcome you to our practice and ask about your recent visit. Is this a good time to talk? yes    Tell me about your visit with us. What things went well?    It went very good, everyone was so nice     We're always looking for ways to make our patients' experiences even better. Do you have recommendations on ways we may improve?    no  Overall were you satisfied with your first visit to our practice? yes       I appreciate you taking the time to speak with me today. Is there anything else I can do for you? no      Thank you, and have a great day.

## 2023-05-19 DIAGNOSIS — F41.9 ANXIETY: ICD-10-CM

## 2023-05-19 RX ORDER — SERTRALINE HYDROCHLORIDE 25 MG/1
25 TABLET, FILM COATED ORAL DAILY
Qty: 90 TABLET | Refills: 1 | Status: SHIPPED | OUTPATIENT
Start: 2023-05-19

## 2023-07-25 ENCOUNTER — TELEPHONE (OUTPATIENT)
Dept: SURGERY | Facility: CLINIC | Age: 28
End: 2023-07-25

## 2023-07-25 NOTE — TELEPHONE ENCOUNTER
Patient called to cancel colonoscopy with Dr. Donaldson that is scheduled for 8/8/23 at [facility] due to her insurance is no longer in effect.